# Patient Record
Sex: FEMALE | Race: BLACK OR AFRICAN AMERICAN | URBAN - METROPOLITAN AREA
[De-identification: names, ages, dates, MRNs, and addresses within clinical notes are randomized per-mention and may not be internally consistent; named-entity substitution may affect disease eponyms.]

---

## 2017-01-06 ENCOUNTER — DOCUMENTATION ONLY (OUTPATIENT)
Dept: CARE COORDINATION | Facility: CLINIC | Age: 32
End: 2017-01-06

## 2017-01-06 ENCOUNTER — TELEPHONE (OUTPATIENT)
Dept: MIDWIFE SERVICES | Facility: CLINIC | Age: 32
End: 2017-01-06

## 2017-01-06 NOTE — TELEPHONE ENCOUNTER
Right now her BP is normal- and not in a range that we would treat with medication. I would have her schedule a clinic appointment for Monday for a blood pressure check to see if it is getting worse. I would encourage her to drink lots of water and try to hydrate more. This will actually help with the swelling. She can also take ibuprofen in addition to her tylenol. If her symptoms worsen- HA becomes severe, she has visual disturbances or increasing pain, she should come to the ED for a BP check sooner. Cecelia Manzo CNM

## 2017-01-06 NOTE — TELEPHONE ENCOUNTER
Ally the Home Health Nurse visiting the pt today called to say that the pt reported the following signs and symptoms: headache (not resolving with tylenol), swelling in feet and hands and just overall not feeling well since Tuesday 01/03/2017. Pt's BP was 138/88 today. Pt was induced for Gestational HTN and HA that was not resolving, therefore the home health nurse felt that she needed to report the symptoms that the pt is having. Please advise. Corinna Martínez RN

## 2017-01-07 NOTE — PROGRESS NOTES
"[facility] utilizes a different electronic medical record.  The documentation below has been copied from the home care chart following a Maternal Child Health Home visit.      DATE OF ASSESSMENT 1/6/17   DATE OF DELIVERY 1/1/17  SAFETY CONCERNS/CAREGIVER SUPPORT none, safe home, working smoke detector, supportive significant other and family  OTHER CHILDREN IN HOME multip,  15 year old daughter  TYPE OF DELIVERY vaginal with 1st degree laceration, repaired with stiches, stitches and perineum care and comfort measures discussed.     VITAL SIGNS  BLOOD PRESSURE 138/88.  Patient states she was induced for pregnancy induced hypertension.  Denies hx of hypertension.  Reports she has had a headache that has not resolved with rest or pain medications since Tuesday.  Patient has swelling to feet and lower legs as well as hands.  Denies vision changes, swelling to face, or right upper epigastric pain. \"I just dont feel good\"   Reviewed s/s to notify MD for.  Informed patient I would call clinic with BP today and c/o of headache.    HEART RATE 70  TEMPERATURE 98.1  RESPIRATION RATE 18  PAIN general body aches, perineum 4/10 without medication, 2/10 after medication. She is alternating Ibuprofen and Tylenol prn as directed  for pain control   CARDIOPULMONARY regular rate and rhythm no murmur heard, breath sounds normal.   GI ASSESSMENT soft nontender abdomen.  Bowel Movements softening, regular.  Taking stool softener   ASSESSMENT voiding without difficulty, stitches care reviewed knows s/s of infection to look for.   APPETITE good, eating 300 - 500 extra calories per day, staying well hydrated. Advised patient to drink at least 64 fluid ounces of water per day.  Taking Fe supplement.  Reviewed sources of iron rich foods with patient  BREAST ASSESSMENT nipples are sore and red but not cracked or bleeding.  States baby had difficulty latching last night.  Has been pumping and feeding breast milk in bottle. "  Pumped 4 ounces this AM.  Offered to help patient with latch and feeding assessment but declined.  States she fed baby with EBM 2 oz. prior to visit.     LACTATION breasts feel full with milk, mildly tender, no engorgement at this time, Reviewed engorgement prevention/care as well as sore nipple care.     LOCHIA light period like flow, red, no clots. Knows to call provider for large clots or soaking through a pad in an hour  NEUROLOGICAL denies numbness, tingling and headaches   MUSCULOSKELETAL no issues, active range of motion in all extremities, talked about blood clot prevention in the the legs and calves   SKIN negative for pigmentation, brusing, lesions, or rashes   COPING SKILLS/MOOD/ANXIETY Postpartum mood disorders discussed, left screening sheet with patient to fill out. Patient states she feels well supported right now. Difference between postpartum blues, depression, and psychosis all discussed,   EMOTIONAL/SOCIAL/SPIRITUAL CONCERNS none   EDUCATION PROVIDED see above remarks, [place] breastfeeding resources, breastfeeding teaching done, Reviewed latch, feeding positions, pumping and storage of breastmilk, perineum care, general postpartum warning signs to notify MD for,  postpartum depression screening, hand hygeine,  sleep patterns.   REFERRALS/PLAN OF CARE mom will call to make 6 week PP appt with [place] [facility].  TC to clinic today to report BP and patient symptoms, spoke with triage nurse MARTHA Weinstein.  Clinic nurse would notify on call provider with information.   EMERGENCY PLAN call provider office with any questions or concerns, mom knows OB and PEDS nurse triage line phone number for any questions or concerns. Family aware to call 911 in case of emergency.

## 2017-01-10 ENCOUNTER — TELEPHONE (OUTPATIENT)
Dept: MIDWIFE SERVICES | Facility: CLINIC | Age: 32
End: 2017-01-10

## 2017-01-10 NOTE — TELEPHONE ENCOUNTER
Report from visiting RN of headache and elevated BP on Friday.  Failed appt 1/9 for follow up.  Called at home today.  Stated no HA for 2 days and swelling was going down but still there.  Instructed she could come to [facility] for BP check tomorrow if not feeling well but no concern based on symptoms at this time.  Rinku Mckay APRN, CNM